# Patient Record
Sex: FEMALE | Race: WHITE | NOT HISPANIC OR LATINO
[De-identification: names, ages, dates, MRNs, and addresses within clinical notes are randomized per-mention and may not be internally consistent; named-entity substitution may affect disease eponyms.]

---

## 2022-07-14 ENCOUNTER — APPOINTMENT (OUTPATIENT)
Dept: HUMAN REPRODUCTION | Facility: CLINIC | Age: 42
End: 2022-07-14

## 2022-07-14 PROCEDURE — 36415 COLL VENOUS BLD VENIPUNCTURE: CPT

## 2022-07-14 PROCEDURE — 76830 TRANSVAGINAL US NON-OB: CPT

## 2022-07-14 PROCEDURE — 99205 OFFICE O/P NEW HI 60 MIN: CPT | Mod: 25

## 2022-11-08 ENCOUNTER — RESULT REVIEW (OUTPATIENT)
Age: 42
End: 2022-11-08

## 2022-12-02 ENCOUNTER — TRANSCRIPTION ENCOUNTER (OUTPATIENT)
Age: 42
End: 2022-12-02

## 2022-12-03 ENCOUNTER — TRANSCRIPTION ENCOUNTER (OUTPATIENT)
Age: 42
End: 2022-12-03

## 2022-12-03 ENCOUNTER — RESULT REVIEW (OUTPATIENT)
Age: 42
End: 2022-12-03

## 2022-12-03 ENCOUNTER — INPATIENT (INPATIENT)
Facility: HOSPITAL | Age: 42
LOS: 0 days | Discharge: HOME | DRG: 819 | End: 2022-12-04
Attending: OBSTETRICS & GYNECOLOGY | Admitting: OBSTETRICS & GYNECOLOGY
Payer: COMMERCIAL

## 2022-12-03 VITALS
WEIGHT: 138.01 LBS | DIASTOLIC BLOOD PRESSURE: 57 MMHG | OXYGEN SATURATION: 100 % | HEART RATE: 86 BPM | RESPIRATION RATE: 18 BRPM | TEMPERATURE: 98 F | HEIGHT: 66 IN | SYSTOLIC BLOOD PRESSURE: 110 MMHG

## 2022-12-03 DIAGNOSIS — O00.90 UNSPECIFIED ECTOPIC PREGNANCY WITHOUT INTRAUTERINE PREGNANCY: ICD-10-CM

## 2022-12-03 LAB
ALBUMIN SERPL ELPH-MCNC: 4.2 G/DL — SIGNIFICANT CHANGE UP (ref 3.3–5)
ALP SERPL-CCNC: 60 U/L — SIGNIFICANT CHANGE UP (ref 40–120)
ALT FLD-CCNC: 21 U/L — SIGNIFICANT CHANGE UP (ref 10–45)
ANION GAP SERPL CALC-SCNC: 10 MMOL/L — SIGNIFICANT CHANGE UP (ref 5–17)
APPEARANCE UR: CLEAR — SIGNIFICANT CHANGE UP
AST SERPL-CCNC: 22 U/L — SIGNIFICANT CHANGE UP (ref 10–40)
BACTERIA # UR AUTO: NEGATIVE — SIGNIFICANT CHANGE UP
BASOPHILS # BLD AUTO: 0.04 K/UL — SIGNIFICANT CHANGE UP (ref 0–0.2)
BASOPHILS NFR BLD AUTO: 0.6 % — SIGNIFICANT CHANGE UP (ref 0–2)
BILIRUB SERPL-MCNC: 0.3 MG/DL — SIGNIFICANT CHANGE UP (ref 0.2–1.2)
BILIRUB UR-MCNC: NEGATIVE — SIGNIFICANT CHANGE UP
BUN SERPL-MCNC: 13 MG/DL — SIGNIFICANT CHANGE UP (ref 7–23)
CALCIUM SERPL-MCNC: 9.1 MG/DL — SIGNIFICANT CHANGE UP (ref 8.4–10.5)
CHLORIDE SERPL-SCNC: 105 MMOL/L — SIGNIFICANT CHANGE UP (ref 96–108)
CO2 SERPL-SCNC: 24 MMOL/L — SIGNIFICANT CHANGE UP (ref 22–31)
COLOR SPEC: YELLOW — SIGNIFICANT CHANGE UP
CREAT SERPL-MCNC: 0.65 MG/DL — SIGNIFICANT CHANGE UP (ref 0.5–1.3)
DIFF PNL FLD: NEGATIVE — SIGNIFICANT CHANGE UP
EGFR: 112 ML/MIN/1.73M2 — SIGNIFICANT CHANGE UP
EOSINOPHIL # BLD AUTO: 0.04 K/UL — SIGNIFICANT CHANGE UP (ref 0–0.5)
EOSINOPHIL NFR BLD AUTO: 0.6 % — SIGNIFICANT CHANGE UP (ref 0–6)
EPI CELLS # UR: 0 /HPF — SIGNIFICANT CHANGE UP
GLUCOSE SERPL-MCNC: 80 MG/DL — SIGNIFICANT CHANGE UP (ref 70–99)
GLUCOSE UR QL: NEGATIVE — SIGNIFICANT CHANGE UP
HCG SERPL-ACNC: 1891 MIU/ML — HIGH
HCT VFR BLD CALC: 36.1 % — SIGNIFICANT CHANGE UP (ref 34.5–45)
HGB BLD-MCNC: 11.6 G/DL — SIGNIFICANT CHANGE UP (ref 11.5–15.5)
HYALINE CASTS # UR AUTO: 1 /LPF — SIGNIFICANT CHANGE UP (ref 0–2)
IMM GRANULOCYTES NFR BLD AUTO: 0.3 % — SIGNIFICANT CHANGE UP (ref 0–0.9)
INR BLD: 1.08 RATIO — SIGNIFICANT CHANGE UP (ref 0.88–1.16)
KETONES UR-MCNC: NEGATIVE — SIGNIFICANT CHANGE UP
LEUKOCYTE ESTERASE UR-ACNC: NEGATIVE — SIGNIFICANT CHANGE UP
LYMPHOCYTES # BLD AUTO: 1.54 K/UL — SIGNIFICANT CHANGE UP (ref 1–3.3)
LYMPHOCYTES # BLD AUTO: 21.2 % — SIGNIFICANT CHANGE UP (ref 13–44)
MAGNESIUM SERPL-MCNC: 1.9 MG/DL — SIGNIFICANT CHANGE UP (ref 1.6–2.6)
MCHC RBC-ENTMCNC: 26.1 PG — LOW (ref 27–34)
MCHC RBC-ENTMCNC: 32.1 GM/DL — SIGNIFICANT CHANGE UP (ref 32–36)
MCV RBC AUTO: 81.1 FL — SIGNIFICANT CHANGE UP (ref 80–100)
MONOCYTES # BLD AUTO: 0.67 K/UL — SIGNIFICANT CHANGE UP (ref 0–0.9)
MONOCYTES NFR BLD AUTO: 9.2 % — SIGNIFICANT CHANGE UP (ref 2–14)
NEUTROPHILS # BLD AUTO: 4.94 K/UL — SIGNIFICANT CHANGE UP (ref 1.8–7.4)
NEUTROPHILS NFR BLD AUTO: 68.1 % — SIGNIFICANT CHANGE UP (ref 43–77)
NITRITE UR-MCNC: NEGATIVE — SIGNIFICANT CHANGE UP
NRBC # BLD: 0 /100 WBCS — SIGNIFICANT CHANGE UP (ref 0–0)
PH UR: 5.5 — SIGNIFICANT CHANGE UP (ref 5–8)
PLATELET # BLD AUTO: 264 K/UL — SIGNIFICANT CHANGE UP (ref 150–400)
POTASSIUM SERPL-MCNC: 3.8 MMOL/L — SIGNIFICANT CHANGE UP (ref 3.5–5.3)
POTASSIUM SERPL-SCNC: 3.8 MMOL/L — SIGNIFICANT CHANGE UP (ref 3.5–5.3)
PROT SERPL-MCNC: 7 G/DL — SIGNIFICANT CHANGE UP (ref 6–8.3)
PROT UR-MCNC: SIGNIFICANT CHANGE UP
PROTHROM AB SERPL-ACNC: 12.5 SEC — SIGNIFICANT CHANGE UP (ref 10.5–13.4)
RBC # BLD: 4.45 M/UL — SIGNIFICANT CHANGE UP (ref 3.8–5.2)
RBC # FLD: 14.8 % — HIGH (ref 10.3–14.5)
RBC CASTS # UR COMP ASSIST: 4 /HPF — SIGNIFICANT CHANGE UP (ref 0–4)
SARS-COV-2 RNA SPEC QL NAA+PROBE: SIGNIFICANT CHANGE UP
SODIUM SERPL-SCNC: 139 MMOL/L — SIGNIFICANT CHANGE UP (ref 135–145)
SP GR SPEC: 1.03 — HIGH (ref 1.01–1.02)
UROBILINOGEN FLD QL: NEGATIVE — SIGNIFICANT CHANGE UP
WBC # BLD: 7.25 K/UL — SIGNIFICANT CHANGE UP (ref 3.8–10.5)
WBC # FLD AUTO: 7.25 K/UL — SIGNIFICANT CHANGE UP (ref 3.8–10.5)
WBC UR QL: 1 /HPF — SIGNIFICANT CHANGE UP (ref 0–5)

## 2022-12-03 PROCEDURE — 76801 OB US < 14 WKS SINGLE FETUS: CPT | Mod: 26

## 2022-12-03 PROCEDURE — 88305 TISSUE EXAM BY PATHOLOGIST: CPT | Mod: 26

## 2022-12-03 PROCEDURE — 99284 EMERGENCY DEPT VISIT MOD MDM: CPT

## 2022-12-03 PROCEDURE — 76817 TRANSVAGINAL US OBSTETRIC: CPT | Mod: 26

## 2022-12-03 RX ORDER — SODIUM CHLORIDE 9 MG/ML
1000 INJECTION INTRAMUSCULAR; INTRAVENOUS; SUBCUTANEOUS ONCE
Refills: 0 | Status: COMPLETED | OUTPATIENT
Start: 2022-12-03 | End: 2022-12-03

## 2022-12-03 RX ORDER — ONDANSETRON 8 MG/1
4 TABLET, FILM COATED ORAL ONCE
Refills: 0 | Status: COMPLETED | OUTPATIENT
Start: 2022-12-03 | End: 2022-12-04

## 2022-12-03 RX ORDER — ACETAMINOPHEN 500 MG
1000 TABLET ORAL ONCE
Refills: 0 | Status: COMPLETED | OUTPATIENT
Start: 2022-12-03 | End: 2022-12-03

## 2022-12-03 RX ORDER — HYDROMORPHONE HYDROCHLORIDE 2 MG/ML
0.5 INJECTION INTRAMUSCULAR; INTRAVENOUS; SUBCUTANEOUS
Refills: 0 | Status: DISCONTINUED | OUTPATIENT
Start: 2022-12-03 | End: 2022-12-04

## 2022-12-03 RX ORDER — SODIUM CHLORIDE 9 MG/ML
1000 INJECTION, SOLUTION INTRAVENOUS
Refills: 0 | Status: DISCONTINUED | OUTPATIENT
Start: 2022-12-03 | End: 2022-12-03

## 2022-12-03 RX ADMIN — SODIUM CHLORIDE 1000 MILLILITER(S): 9 INJECTION INTRAMUSCULAR; INTRAVENOUS; SUBCUTANEOUS at 15:53

## 2022-12-03 NOTE — H&P ADULT - ASSESSMENT
----- Message from Vilma Mccall PA-C sent at 6/16/2017  1:36 PM CDT -----  PSA slightly elevated from 1 year ago. Reviewed with Reggie who recommends repeat PSA in 6 months.   43 yo  LMP 10/22/22 (at 6+0 by LMP) presenting with sharp RLQ pain. bHCG 1891, TVUS significant for 2x2x2 cm rounded structure with decreased echogenicity with ring morphology and trace FF concerning for ectopic pregnancy. Pt's abdominal exam significant for RLQ tenderness. No rebound, voluntary guarding. H+H wnl, vital signs stable. Given abdominal exam pt is not a candidate for methotrexate therapy.     - Admit to Dr. Rita Taveras  - Consents for diagnostic laparoscopy for ectopic pregnancy signed.   - Fetal demise paperwork completed   - Rh positive, pt will not require rhogam   - NPO  - LR @125  - Please obtain COVID swab     Briana Barlkey, PGY2  d/w Dr. Taveras and Dr. Lr

## 2022-12-03 NOTE — ED PROVIDER NOTE - OBJECTIVE STATEMENT
9
41 y/o female LMP 10/22/22  currently 4 weeks pregnant no confirmed IUP no PMHx, PSx appendectomy now presenting to the ED with RLQ pain, nausea, vomiting since yesterday. Denies vaginal discharge or bleeding. Denied urinary freq/ urgency. Patient reports h/o ruptured ovarian cyst couple years ago but no surgery. OB appt 22 with Dr. Rita Stern. Pregnancy is desired

## 2022-12-03 NOTE — ED ADULT NURSE NOTE - OBJECTIVE STATEMENT
patient 4 weeks pregnant , received alert & oriented x4, ambulatory accompanied by family complaining of RLQ pain, nausea, vomiting since yesterday. Denies vaginal discharges, bleeding or fever. No urinary symptoms. LMP-10/22/22 .

## 2022-12-03 NOTE — ED ADULT TRIAGE NOTE - HEART RATE (BEATS/MIN)
86
CC:  Dayana Bustos is here today for re-pap after unsatisfactory pap.    Medications: medications verified and updated  Refills needed today?  NO  Patient would like communication of their results via:      Cell Phone:   Telephone Information:   Mobile [phone]     Okay to leave a message containing results? Yes     If your visit includes an exam today, would you like an assistant to be present in the room during that time? NO            
Repeat pap collection. No office charge   
no

## 2022-12-03 NOTE — ED PROVIDER NOTE - RESPIRATORY, MLM
Benefits, risks, and possible complications of procedure explained to patient/caregiver who verbalized understanding and gave written consent. Breath sounds clear and equal bilaterally.

## 2022-12-03 NOTE — H&P ADULT - HISTORY OF PRESENT ILLNESS
ELMER LIU  42y  Female [id number]    HPI:  41yo  LMP 10/22/22 (at 6+0 by LMP) presenting with sharp RLQ pain x24h. Pt states pain started suddenly. Pt felt nauseous, unable to walk normally. Pt states she had one episode of emesis and diarrhea, prompting her to come to the ED. Pt states she the pain has not improved since its onset. Pt did not take anything for the pain. Denies cramping, denies vaginal bleeding. This is a desired pregnancy. Pt's first prenatal visit was scheduled for 22.     Pt last ate at 12p 12/3/22.       Name of GYN Physician: Dr. Rita Taveras     Past OB:  CS x2    Past gyn: Known posterior fibroid, asymptomatic. 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      Vital Signs Last 24 Hrs  T(C): 36.7 (03 Dec 2022 19:45), Max: 36.7 (03 Dec 2022 19:45)  T(F): 98 (03 Dec 2022 19:45), Max: 98 (03 Dec 2022 19:45)  HR: 70 (03 Dec 2022 19:45) (70 - 86)  BP: 120/64 (03 Dec 2022 19:45) (110/57 - 120/64)  BP(mean): 80 (03 Dec 2022 19:45) (80 - 80)  RR: 16 (03 Dec 2022 19:45) (16 - 18)  SpO2: 100% (03 Dec 2022 19:45) (100% - 100%)    Parameters below as of 03 Dec 2022 19:45  Patient On (Oxygen Delivery Method): room air        Physical Exam:   General: sitting comfortably in bed, NAD   CV: RR S1S2 no m/r/g  Lungs: CTA b/l, good air flow b/l   Back: No CVA tenderness  Abd: Soft, moderate tenderness to RLQ. Voluntary guarding. No rebound. non-distended.   :  No bleeding on pad.     Ext: non-tender b/l, no edema     LABS:                              11.6   7.25  )-----------( 264      ( 03 Dec 2022 15:52 )             36.1         139  |  105  |  13  ----------------------------<  80  3.8   |  24  |  0.65    Ca    9.1      03 Dec 2022 15:52  Mg     1.9         TPro  7.0  /  Alb  4.2  /  TBili  0.3  /  DBili  x   /  AST  22  /  ALT  21  /  AlkPhos  60      I&O's Detail    PT/INR - ( 03 Dec 2022 17:57 )   PT: 12.5 sec;   INR: 1.08 ratio           Urinalysis Basic - ( 03 Dec 2022 15:51 )    Color: Yellow / Appearance: Clear / S.027 / pH: x  Gluc: x / Ketone: Negative  / Bili: Negative / Urobili: Negative   Blood: x / Protein: Trace / Nitrite: Negative   Leuk Esterase: Negative / RBC: 4 /hpf / WBC 1 /HPF   Sq Epi: x / Non Sq Epi: 0 /hpf / Bacteria: Negative    HCG Quantitative, Serum: 1891.0    RADIOLOGY & ADDITIONAL STUDIES:  < from: US OB <=14 Weeks, First Gestation (22 @ 17:32) >  US OB LES THAN 14 WKS 1ST GEST                        ACC: [id number] EXAM:  US OB TRANSVAGINAL                          PROCEDURE DATE:  2022          INTERPRETATION:  CLINICAL INFORMATION: Right-sided pelvic pain in   pregnancy. Beta hCG 1891. History of 2 C-sections.    LMP: 10/22/2022    Estimated Gestational Age by LMP: 6 weeks and 0 days    COMPARISON: CT abdomen and pelvis 2014.    Endovaginal and transabdominal pelvic sonogram. Color and Spectral   Dopplerwas performed.    FINDINGS:  Uterus:  in indentation noted. Mildly lobular contour. No   intrauterine pregnancy is noted. A posterior, subserosal fibroid measures   4.1 x 3.6 x 3.7 cm.    Right ovary: Evaluated better transabdominally, measures 2.3 x 1.4 x 2.6   cm. Within normal limits. Normal arterial and venous waveforms.  In the right adnexa region, there is a 2.1 x 1.9 x 2.2 cm rounded   structure with central decreased echogenicity demonstrating ring   morphology on color Doppler. This appears separate from the ovary.    Left ovary: 3.2 x 1.4 x 1.1 cm.. Within normal limits. Normal arterial   and venous waveforms.    Fluid: There is a trace amount of fluid in the right adnexa and   cul-de-sac.    IMPRESSION:    No intrauterine gestation.    There is a 2.2 cm right adnexal structure suspicious for an ectopic   pregnancy. Recommend interval follow-up imaging and correlation with beta   hCG.    Trace free fluid.    --- End of Report ---          < end of copied text >

## 2022-12-03 NOTE — H&P ADULT - ATTENDING COMMENTS
Gyn Attending On Call  Agree with above  Pt with right ectopic pregnancy  Pt for Diagnostic Laparoscopy possible salpingectomy.    Pt understands risks/benefits of surgery including but not limited to bleeding, infection, injury to bowel/bladder, nerve damage, blood tx, laparotomy and even death.  Pt has verbalized understanding of the above and has singed informed consent.    ORLIN Lr

## 2022-12-03 NOTE — BRIEF OPERATIVE NOTE - OPERATION/FINDINGS
Laparoscopy revealed dense adhesions between the uterus and anterior abdominal wall, the uterus and the omentum, the anterior abdominal wall and the omentum, and the omentum and the left adnexal structures. Right sided fallopian tube and ectopic pregnancy visualized. Excellent hemostasis at the conclusion of the procedure.

## 2022-12-03 NOTE — ED PROVIDER NOTE - CLINICAL SUMMARY MEDICAL DECISION MAKING FREE TEXT BOX
42 year old female 3rd pregnancy desirable child no PMHx, PSx appendectomy no confirmed IUP, + urine test home, first OB appt 12/27 presented to the ED with RLQ pain and nausea vomiting. Patient has RLQ pain on exam, right adnexal tenderness on bimanual no vaginal discharge. Will obtain blood work, T&S, coags, urine, transvaginal US concern for rupture ovarian cyst, torsion, or ectopic. will re-eval ~ZR

## 2022-12-03 NOTE — ED ADULT NURSE NOTE - CHPI ED NUR TIMING2
SUBJECTIVE: Pt reports still with abdominal discomfort, some relief with decadron but still feels constipated, GI consult called    OVERNIGHT EVENTS: none    Vital Signs Last 24 Hrs  T(C): 36.6 (25 Feb 2018 14:04), Max: 36.9 (24 Feb 2018 23:54)  T(F): 97.9 (25 Feb 2018 14:04), Max: 98.5 (24 Feb 2018 23:54)  HR: 71 (25 Feb 2018 14:04) (67 - 82)  BP: 107/72 (25 Feb 2018 14:04) (101/61 - 118/75)  BP(mean): --  RR: 18 (25 Feb 2018 14:04) (18 - 18)  SpO2: 100% (25 Feb 2018 14:04) (96% - 100%)    PHYSICAL EXAM:    General: No Acute Distress     Neurological: Awake, alert oriented to person, place and time, Following Commands, PERRL, EOMI, Face Symmetrical, Speech Fluent, Moving all extremities, Muscle Strength normal in all four extremities, No Drift, Sensation to Light Touch Intact    Pulmonary: Clear to Auscultation, No Rales, No Rhonchi, No Wheezes     Cardiovascular: S1, S2, Regular Rate and Rhythm     Gastrointestinal: Soft, Nontender, Nondistended     Extremities: No calf tenderness     Incision: incision c/d/i    LABS:             02-24 @ 07:01 - 02-25 @ 07:00  --------------------------------------------------------  IN: 740 mL / OUT: 1150 mL / NET: -410 mL    02-25 @ 07:01 - 02-25 @ 14:50  --------------------------------------------------------  IN: 400 mL / OUT: 0 mL / NET: 400 mL      DRAINS: none    MEDICATIONS:  Antibiotics:    Neuro:  acetaminophen   Tablet 650 milliGRAM(s) Oral every 6 hours PRN For Temp greater than 38 C (100.4 F)  acetaminophen   Tablet. 650 milliGRAM(s) Oral every 6 hours PRN Mild Pain (1 - 3)  diazepam    Tablet 5 milliGRAM(s) Oral three times a day PRN muscle  ondansetron Injectable 4 milliGRAM(s) IV Push every 6 hours PRN Nausea  oxyCODONE    5 mG/acetaminophen 325 mG 1 Tablet(s) Oral every 4 hours PRN Moderate Pain  oxyCODONE    5 mG/acetaminophen 325 mG 2 Tablet(s) Oral every 6 hours PRN Severe Pain    Cardiac:    Pulm:    GI/:  aluminum hydroxide/magnesium hydroxide/simethicone Suspension 30 milliLiter(s) Oral every 4 hours PRN Dyspepsia  docusate sodium 100 milliGRAM(s) Oral three times a day  famotidine    Tablet 20 milliGRAM(s) Oral every 12 hours PRN Dyspepsia  magnesium hydroxide Suspension 30 milliLiter(s) Oral every 12 hours PRN Constipation  polyethylene glycol 3350 17 Gram(s) Oral daily  polyethylene glycol/electrolyte Solution 1000 milliLiter(s) Oral two times a day  senna 2 Tablet(s) Oral at bedtime    Other:   ascorbic acid 500 milliGRAM(s) Oral two times a day  dexamethasone  Injectable 4 milliGRAM(s) IV Push every 6 hours  enoxaparin Injectable 40 milliGRAM(s) SubCutaneous every 24 hours  multivitamin 1 Tablet(s) Oral daily    DIET: [x] Regular [] CCD [] Renal [] Puree [] Dysphagia [] Tube Feeds:     IMAGING:   < from: CT Abdomen and Pelvis w/ Oral Cont and w/ IV Cont (02.24.18 @ 21:36) >  IMPRESSION:   Moderate stool throughout the colon. No bowel obstruction. No acute   abnormality in the abdomen and pelvis.    < end of copied text > sudden onset

## 2022-12-03 NOTE — ASU DISCHARGE PLAN (ADULT/PEDIATRIC) - CARE PROVIDER_API CALL
Rashaad Hughes)  Obstetrics and Gynecology  [address] [place] [address], [address]  [place] [address]  Phone: [phone]  Fax: [phone]  Follow Up Time: 2 weeks

## 2022-12-03 NOTE — ASU DISCHARGE PLAN (ADULT/PEDIATRIC) - ASU DC SPECIAL INSTRUCTIONSFT
After your surgery it is normal to experience:    •	Vaginal bleeding- can last 1-2 weeks and should not be heavier than a period. It may come and go and be red, brown or pink. Use pads, not tampons.  •	Cramping- Is common especially within the first 24 hours. This should be relieved by taking over the counter motrin, advil or Tylenol.  •	Watery discharge- can be seen for a day or two after hysteroscopy because some of the fluid that is put into the uterus during surgery may continue to leak out for a day or two.  •	Vaginal soreness/irritation- can occur in the first few days after surgery because of the instruments that were used in the vagina. Soreness can be treated with ice pack and irritation can be taken care of with an emollient such as balmex or aquaphor that you can put directly on the irritated area.    Restrictions: For 10-14 days after the surgery you should avoid the following:    •	Tampons  •	Sex  •	Vigorous gym exercise  •	Swimming  pools and tub baths  •	Wait a day or two before going back to work    Anesthesia Precautions:  For the next 24 hours do not:   •	drive a car,  •	 operate power tools or machinery,  •	drink alcohol, beer, or wine,   •	make important personal or business decisions    Diet:   •	Resume Regular diet but Progress diet slowly     Physician Notification- Warning signs to look out for  •	Heavy Vaginal Bleeding   •	Shortness of breath or chest pain  •	Severe Abdominal Pain  •	Persistent nausea and vomiting  •	Pain not relieved by medications  •	Fever greater than 100.4®F  •	Inability to tolerate liquids or foods  •	Unable to urinate after 8 hours

## 2022-12-03 NOTE — ED PROVIDER NOTE - NS ED ATTENDING STATEMENT MOD
This was a shared visit with the TRUPTI. I reviewed and verified the documentation and independently performed the documented:

## 2022-12-04 VITALS
SYSTOLIC BLOOD PRESSURE: 103 MMHG | TEMPERATURE: 97 F | OXYGEN SATURATION: 100 % | DIASTOLIC BLOOD PRESSURE: 56 MMHG | HEART RATE: 78 BPM | RESPIRATION RATE: 16 BRPM

## 2022-12-04 PROCEDURE — 99285 EMERGENCY DEPT VISIT HI MDM: CPT | Mod: 25

## 2022-12-04 PROCEDURE — 87086 URINE CULTURE/COLONY COUNT: CPT

## 2022-12-04 PROCEDURE — 85610 PROTHROMBIN TIME: CPT

## 2022-12-04 PROCEDURE — 36415 COLL VENOUS BLD VENIPUNCTURE: CPT

## 2022-12-04 PROCEDURE — 76817 TRANSVAGINAL US OBSTETRIC: CPT

## 2022-12-04 PROCEDURE — 86850 RBC ANTIBODY SCREEN: CPT

## 2022-12-04 PROCEDURE — 88305 TISSUE EXAM BY PATHOLOGIST: CPT

## 2022-12-04 PROCEDURE — 83735 ASSAY OF MAGNESIUM: CPT

## 2022-12-04 PROCEDURE — U0003: CPT

## 2022-12-04 PROCEDURE — 80053 COMPREHEN METABOLIC PANEL: CPT

## 2022-12-04 PROCEDURE — 86901 BLOOD TYPING SEROLOGIC RH(D): CPT

## 2022-12-04 PROCEDURE — 85025 COMPLETE CBC W/AUTO DIFF WBC: CPT

## 2022-12-04 PROCEDURE — 81001 URINALYSIS AUTO W/SCOPE: CPT

## 2022-12-04 PROCEDURE — 84702 CHORIONIC GONADOTROPIN TEST: CPT

## 2022-12-04 PROCEDURE — 76801 OB US < 14 WKS SINGLE FETUS: CPT

## 2022-12-04 PROCEDURE — 86900 BLOOD TYPING SEROLOGIC ABO: CPT

## 2022-12-04 PROCEDURE — C9399: CPT

## 2022-12-04 RX ADMIN — HYDROMORPHONE HYDROCHLORIDE 0.5 MILLIGRAM(S): 2 INJECTION INTRAMUSCULAR; INTRAVENOUS; SUBCUTANEOUS at 01:10

## 2022-12-04 RX ADMIN — ONDANSETRON 4 MILLIGRAM(S): 8 TABLET, FILM COATED ORAL at 00:49

## 2022-12-04 RX ADMIN — HYDROMORPHONE HYDROCHLORIDE 0.5 MILLIGRAM(S): 2 INJECTION INTRAMUSCULAR; INTRAVENOUS; SUBCUTANEOUS at 00:49

## 2022-12-04 NOTE — CHART NOTE - NSCHARTNOTEFT_GEN_A_CORE
GYN POST-OP CHECK NOTE    SUBJECTIVE:    41yo F now POD0 s/p LSC R salpingectomy for ectopic pregnancy.     Pt reports pain well controlled by pain meds.  She is sitting in the chair.  Godinez catheter removed in OR.  She is tolerating sips of clear liquids w/o N/V.  She denies fever, chills, nausea, vomiting, headache, dizziness, chest pain, palpitations, shortness of breath.    HYDROmorphone  Injectable 0.5 milliGRAM(s) IV Push every 10 minutes PRN      OBJECTIVE:    VITAL SIGNS:  Vital Signs Last 24 Hrs  T(C): 37.1 (03 Dec 2022 23:30), Max: 37.1 (03 Dec 2022 23:30)  T(F): 98.8 (03 Dec 2022 23:30), Max: 98.8 (03 Dec 2022 23:30)  HR: 71 (04 Dec 2022 00:15) (67 - 86)  BP: 109/62 (04 Dec 2022 00:15) (109/62 - 120/64)  BP(mean): 79 (04 Dec 2022 00:15) (74 - 80)  RR: 16 (04 Dec 2022 00:15) (16 - 18)  SpO2: 100% (04 Dec 2022 00:15) (99% - 100%)    Parameters below as of 03 Dec 2022 23:30  Patient On (Oxygen Delivery Method): nasal cannula  O2 Flow (L/min): 2    CAPILLARY BLOOD GLUCOSE            PHYSICAL EXAM:  GEN: NAD, A&Ox3  CV: RRR, no m/g/r  LUNGS: CTAB  ABD: soft, appropriately tender, ND, +BS  Incision: CDI, dressings in place  EXT: WWP, no edema/TTP    LABS:    CBC: 12-03-22 @ 15:52          11.6  7.25 )-------( 264          36.1        BMP: 12-03-22 @ 15:52  139|105|13  ------------------<80  3.8|24|0.65    AST/ALT: 22/21      ASSESSMENT:  41yo F now POD0 s/p LSC R salpingectomy for ectopic pregnancy.   Patient is stable and progressing normally postoperatively.    NEURO: pain well controlled on current regimen  CV: hemodyamically stable  PULM: increase incentive spirometry  GI: advance diet as tolerated  : due to void   HEME: SCDs, early ambulation  ID: afebrile    Briana Barkley, PGY2

## 2022-12-05 LAB
CULTURE RESULTS: NO GROWTH — SIGNIFICANT CHANGE UP
SPECIMEN SOURCE: SIGNIFICANT CHANGE UP

## 2022-12-14 ENCOUNTER — TRANSCRIPTION ENCOUNTER (OUTPATIENT)
Age: 42
End: 2022-12-14

## 2022-12-14 ENCOUNTER — APPOINTMENT (OUTPATIENT)
Dept: OBGYN | Facility: CLINIC | Age: 42
End: 2022-12-14

## 2022-12-14 VITALS
DIASTOLIC BLOOD PRESSURE: 89 MMHG | SYSTOLIC BLOOD PRESSURE: 120 MMHG | HEIGHT: 66 IN | WEIGHT: 138 LBS | BODY MASS INDEX: 22.18 KG/M2

## 2022-12-14 DIAGNOSIS — O00.109 UNSPECIFIED TUBAL PREGNANCY WITHOUT INTRAUTERINE PREGNANCY: ICD-10-CM

## 2022-12-14 PROCEDURE — 99203 OFFICE O/P NEW LOW 30 MIN: CPT

## 2022-12-14 NOTE — HISTORY OF PRESENT ILLNESS
[Pain is well-controlled] : pain is well-controlled [Clean/Dry/Intact] : clean, dry and intact [Pathology reviewed] : pathology reviewed [Fever] : no fever [Chills] : no chills [Nausea] : no nausea [Erythema] : not erythematous [Swelling] : not swollen [de-identified] : 12/4/22 [de-identified] : EUA, diag laparscopy, lsc r salpingectomy [de-identified] : ectopic pregnancy [de-identified] : 41 y/o  presents as new patient for establishment of care and postop visit. Pt was seen in ED on 12/3/22 at [facility] and underwent emergent lsc R salpingectomy for ectopic pregnancy with Dr Lr with no complications. Pt was d/c on day of surgery. Pt has no significant complaints. s/p EUA, diag laparoscopy, lsc r salpingectomy for ectopic pregnancy on 22 with no complications. Pt is doing well and has no complaints.\par \par GYNHx: fibroids\par OBHx: C/Sx2 (32w and 39w), ectopic (22)\par PSHx: C/Sx2, lsc appendectomy (), xlap resection of anterior wall desmoid tumor \par SocialHx: negx3\par FamHx: no sig cancer in family\par \par Intraoperative Findings:\par Dense adhesions of the uterus to anterior abdominal wall and omentum.\par \par Pathology:\par 1  Right fallopian tube with ectopic pregnancy\par Fallopian tube, right, salpingectomy:\par - Fallopian tube with ectopic pregnancy\par \par Verified by: Ruby Johnson MD\par (Electronic Signature) [de-identified] : incision c/d/i. abd soft, nt, nd

## 2022-12-14 NOTE — PLAN
[FreeTextEntry1] : 43 y/o s/p emergent lsc R salpingectomy for ectopic pregnancy. Stable.\par \par -routine postop care\par -pelvic rest\par -RTO 4 weeks\par -pt to f/u Dr. Taveras for routine GYN care after next visit\par \par fibroid uterus\par -pt is asymptomatic and it is subserosal so no indication for surgical mgmt \par -yearly sonos or if sx more often

## 2022-12-14 NOTE — END OF VISIT
[FreeTextEntry3] : I, Catie Minor acted as a scribe on behalf of Dr. Hughes on 12/14/2022 \par \par All medical entries made by the scribe were at my, Dr. Hughes, direction and personally dictated by me on 12/14/2022 . I have reviewed the chart and agree that the record accurately reflects my personal performance of the history, physical exam, assessment and plan. I have also personally directed, reviewed, and agreed with the chart.

## 2022-12-14 NOTE — CONSULT LETTER
[Dear  ___] : Dear  [unfilled], [Courtesy Letter:] : I had the pleasure of seeing your patient, [unfilled], in my office today. [( Thank you for referring [unfilled] for consultation for _____ )] : Thank you for referring [unfilled] for consultation for [unfilled] [Please see my note below.] : Please see my note below. [Referral Closing:] : Thank you very much for seeing this patient.  If you have any questions, please do not hesitate to contact me. [Sincerely,] : Sincerely, [FreeTextEntry2] : Dr. Rita Taveras\par [address], [place] [address] [FreeTextEntry3] : Rashaad Hughes MD

## 2023-01-09 ENCOUNTER — APPOINTMENT (OUTPATIENT)
Dept: OBGYN | Facility: CLINIC | Age: 43
End: 2023-01-09

## 2023-07-28 NOTE — BRIEF OPERATIVE NOTE - COMMENTS
[address]  [facility]  [facility]. [address] [place] [address]  Dept: [phone]  Dept Fax: [phone]: [phone]    Visit Date: 7/28/2023    Functionality Assessment/Goals Worksheet     On a scale of 0 (Does not Interfere) to 10 (Completely Interferes)     1. Which number describes how during the past week pain has interfered with       the following:  A. General Activity:  5  B. Mood: 5  C. Walking Ability:  0  D. Normal Work (Includes both work outside the home and housework):  5  E. Relations with Other People:   0  F. Sleep:   2  G. Enjoyment of Life:   3    2. Patient Prefers to Take their Pain Medications:     []  On a regular basis   [x]  Only when necessary    []  Does not take pain medications    3. What are the Patient's Goals/Expectations for Visiting Pain Management?      [x]  Learn about my pain    []  Receive Medication   []  Physical Therapy     []  Treat Depression   [x]  Receive Injections    []  Treat Sleep   []  Deal with Anxiety and Stress   []  Treat Opoid Dependence/Addiction   []  Other: Dictation #: [id number]

## 2023-09-14 NOTE — ASU DISCHARGE PLAN (ADULT/PEDIATRIC) - ACTIVITY LEVEL
9/15/2023      Phyllis Fulton  :  2019        [employer] fax: [phone]      Phyllis is interested in starting pump therapy with [employer].    [facility] [address] [address]    Phone: [phone]    Payor: [employer]IER [employer] [facility] [employer] EMPLOYEE / Plan: [employer]IER [employer] EMP SELECT / Product Type: [employer] EMPLOYEE-SELECT    Please contact the office with any questions.              eSignature 9/15/2023  ______________________________  Gauri Ann  [address] [place] [id number]   No heavy lifting/No tub baths/No douching/No tampons/No intercourse

## 2023-12-09 NOTE — ED ADULT TRIAGE NOTE - RESPIRATORY RATE (BREATHS/MIN)
Pt reports a miscarriage on the 12/4/23.  Reports taking multiple doses of mifepristone.  Pt reports recent low grade fever and continues to have vaginal discharge.         
18

## 2024-10-23 NOTE — ED PROVIDER NOTE - CPE EDP GU CHAPERONE
Body mass index is 44.1 kg/m².  Wt Readings from Last 3 Encounters:   10/23/24 120 kg (265 lb)   05/01/24 114 kg (251 lb)   01/24/24 111 kg (245 lb)      Scheduled for [facility]24[facility]           
ALE Bryan, performed Dr. Kinsey/Name of Chaperone

## 2025-06-25 ENCOUNTER — APPOINTMENT (OUTPATIENT)
Dept: HUMAN REPRODUCTION | Facility: CLINIC | Age: 45
End: 2025-06-25

## 2025-06-25 PROCEDURE — 99215 OFFICE O/P EST HI 40 MIN: CPT | Mod: 25

## (undated) DEVICE — ELCTR DISSECTOR ULTRASONIC CORDLESS CVD JAW 5MM-39CM

## (undated) DEVICE — PREP CHLORAPREP HI-LITE ORANGE 26ML

## (undated) DEVICE — DRSG OPSITE 2.5 X 2"

## (undated) DEVICE — SUT POLYSORB 0 30" GS-23

## (undated) DEVICE — GLV 8.5 PROTEXIS (WHITE)

## (undated) DEVICE — GLV 7.5 PROTEXIS (WHITE)

## (undated) DEVICE — TROCAR COVIDIEN VERSAPORT BLADELESS OPTICAL 5MM STANDARD

## (undated) DEVICE — VENODYNE/SCD SLEEVE CALF LARGE

## (undated) DEVICE — UTERINE MANIPULATOR COOPER SURGICAL 5MM 33CM GREEN

## (undated) DEVICE — TROCAR COVIDIEN VERSASTEP SLEEVE

## (undated) DEVICE — SPECIMEN CONTAINER 100ML

## (undated) DEVICE — LIGASURE ATLAS 10MM 37CM

## (undated) DEVICE — Device

## (undated) DEVICE — GLV 7 PROTEXIS (WHITE)

## (undated) DEVICE — TROCAR COVIDIEN VERSAONE BLADED FIXATION 11MM STANDARD

## (undated) DEVICE — TROCAR GELPOINT MINI ADVANCED

## (undated) DEVICE — DRSG TELFA 3 X 8

## (undated) DEVICE — VISITEC 4X4

## (undated) DEVICE — BLADE SCALPEL SAFETYLOCK #10

## (undated) DEVICE — GOWN TRIMAX LG

## (undated) DEVICE — TUBING SUCTION 20FT

## (undated) DEVICE — VALVE YELLOW PORT SEAL PLUS 5MM

## (undated) DEVICE — DRSG MASTISOL

## (undated) DEVICE — GLV 6.5 PROTEXIS (WHITE)

## (undated) DEVICE — SCOPE WARMER SEAL DISP

## (undated) DEVICE — DRAPE INSTRUMENT POUCH 6.75" X 11"

## (undated) DEVICE — BLADE SCALPEL SAFETYLOCK #15

## (undated) DEVICE — GRASPER COVIDIEN ENDO GRASP ROTICULATOR 5MM W SPIN LOCK

## (undated) DEVICE — DRAPE GENERAL ENDOSCOPY

## (undated) DEVICE — SUT BIOSYN 4-0 18" P-12

## (undated) DEVICE — UTERINE MANIPULATOR CLINICAL INNOVATIONS CLEARVIEW 7CM

## (undated) DEVICE — FOLEY TRAY 16FR 5CC LTX UMETER CLOSED

## (undated) DEVICE — DRSG STERISTRIPS 0.5 X 4"

## (undated) DEVICE — NDL COUNTER FOAM AND MAGNET 40-70

## (undated) DEVICE — SOL IRR POUR NS 0.9% 500ML

## (undated) DEVICE — LAP PAD 18 X 18"

## (undated) DEVICE — SOL IRR POUR H2O 250ML

## (undated) DEVICE — TUBING STRYKEFLOW II SUCTION / IRRIGATOR

## (undated) DEVICE — GLV 8 PROTEXIS (WHITE)

## (undated) DEVICE — PREP BETADINE KIT

## (undated) DEVICE — SYR LUER LOK 10CC

## (undated) DEVICE — POSITIONER FOAM EGG CRATE ULNAR 2PCS (PINK)

## (undated) DEVICE — TROCAR COVIDIEN STEP 5MM SHORT 70MM

## (undated) DEVICE — STAPLER SKIN VISI-STAT 35 WIDE

## (undated) DEVICE — TROCAR COVIDIEN BLUNT TIP HASSAN 10MM

## (undated) DEVICE — D HELP - CLEARVIEW CLEARIFY SYSTEM

## (undated) DEVICE — NDL HYPO SAFE 22G X 1.5" (BLACK)

## (undated) DEVICE — UTERINE MANIPULATOR CONMED VCARE SM 32MM

## (undated) DEVICE — PACK GYN LAPAROSCOPY

## (undated) DEVICE — PREP BETADINE SPONGE STICKS

## (undated) DEVICE — UTERINE MANIPULATOR CONMED VCARE MED 34MM

## (undated) DEVICE — LAPSAC SURGICAL TISSUE POUCH 8X5"

## (undated) DEVICE — DRAPE LIGHT HANDLE COVER (BLUE)

## (undated) DEVICE — DRAPE TOWEL BLUE 17" X 24"

## (undated) DEVICE — TUBING TUR 2 PRONG

## (undated) DEVICE — DRAPE MAYO STAND 30"

## (undated) DEVICE — LIGASURE BLUNT TIP 37CM

## (undated) DEVICE — WARMING BLANKET UPPER ADULT

## (undated) DEVICE — LAPSAC SURGICAL TISSUE POUCH 2X5"

## (undated) DEVICE — TUBING INSUFFLATION LAP FILTER 10FT

## (undated) DEVICE — MEDICATION LABELS W MARKER

## (undated) DEVICE — ENDOCATCH II 15MM

## (undated) DEVICE — DRAPE 1/2 SHEET 40X57"

## (undated) DEVICE — DISSECTOR COVIDIEN ROTICULATOR 5MM W MONOPOLAR CAUTERY

## (undated) DEVICE — NDL HYPO SAFE 18G X 1.5" (PINK)

## (undated) DEVICE — MARKING PEN W RULER

## (undated) DEVICE — LIGASURE MARYLAND 37CM

## (undated) DEVICE — DISSECTOR ENDO PEANUT 5MM

## (undated) DEVICE — TROCAR COVIDIEN VERSASTEP PLUS 11MM STANDARD

## (undated) DEVICE — ENDOCATCH 10MM SPECIMEN POUCH

## (undated) DEVICE — TROCAR APPLIED MEDICAL KII BALLOON BLUNT TIP 12MM X 100MM

## (undated) DEVICE — STAPLER COVIDIEN ENDO GIA STANDARD HANDLE

## (undated) DEVICE — DRAPE 3/4 SHEET W REINFORCEMENT 56X77"

## (undated) DEVICE — FOLEY TRAY 16FR LF URINE METER SURESTEP

## (undated) DEVICE — INSUFFLATION NDL COVIDIEN STEP 14G FOR STEP/VERSASTEP

## (undated) DEVICE — UTERINE MANIPULATOR CONMED VCARE LG 37MM